# Patient Record
(demographics unavailable — no encounter records)

---

## 2020-09-16 NOTE — PCM.PREANE
<Bridget Marley J - Last Filed: 09/16/20 11:40>





Preanesthetic Assessment





- Procedure


Proposed Procedure: 





Diagnostic Colonoscopy





- Anesthesia/Transfusion/Family Hx


Anesthesia History: No Prior Anesthesia


Family History of Anesthesia Reaction: No


Transfusion History: No Prior Transfusion(s)


Intubation History: Unknown





- Review of Systems


Pulmonary: No Symptoms (asthma)


Cardiovascular: No Symptoms (Elevated cholesterol)


Gastrointestinal: No Symptoms (History of mild diverticulosis), Constipation


Other: Reports: Sinus Problem (allergic rhinitis)





- Physical Assessment


NPO Status Date: 09/16/20


Vital Signs: 





HR:


Sat:


Temp:


Resp:


B/P:





ASA Class: 2


Mental Status: Alert & Oriented x3





- Anesthesia Plan


Pre-Op Medication Ordered: None





- Acknowledgements


Anesthesia Type Planned: MAC


Pt an Appropriate Candidate for the Planned Anesthesia: Yes


Alternatives and Risks of Anesthesia Discussed w Pt/Guardian: Yes


Pt/Guardian Understands and Agrees with Anesthesia Plan: Yes





<Camelia Herring M - Last Filed: 09/17/20 08:49>





Preanesthetic Assessment





- Anesthesia/Transfusion/Family Hx


Anesthesia History: Prior Anesthesia Without Reaction


Family History of Anesthesia Reaction: No


Transfusion History: No Prior Transfusion(s)





- Review of Systems


General: No Symptoms


Pulmonary: No Symptoms


Cardiovascular: No Symptoms


Gastrointestinal: No Symptoms, Constipation


Neurological: No Symptoms


Other: Reports: Sinus Problem





- Physical Assessment


NPO Status Date: 09/17/20


NPO Status Time: 04:00


Height: 5 ft 9 in


Weight: 85 kg


ASA Class: 2


Mental Status: Alert & Oriented x3


Airway Class: Mallampati = 1


Dentition: Reports: Normal Dentition


Thyro-Mental Finger Breadths: 3


Mouth Opening Finger Breadths: 3


ROM/Head Extension: Full


Lungs: Clear to Auscultation, Normal Respiratory Effort


Cardiovascular: Regular Rate, Regular Rhythm





- Blood


Blood Available: No





- Anesthesia Plan


Pre-Op Medication Ordered: None





- Acknowledgements


Anesthesia Type Planned: MAC


Pt an Appropriate Candidate for the Planned Anesthesia: Yes


Alternatives and Risks of Anesthesia Discussed w Pt/Guardian: Yes


Pt/Guardian Understands and Agrees with Anesthesia Plan: Yes





PreAnesthesia Questionnaire


Cardiovascular History: Reports: High Cholesterol (was on meds but not any more)


Respiratory History: Reports: Asthma (seasonal-)


Gastrointestinal History: Reports: Chronic Constipation (on and off)


Oncologic (Cancer) History: Reports: None





- Past Surgical History


GI Surgical History: Reports: Colonoscopy





- SUBSTANCE USE


Smoking Status *Q: Never Smoker


Tobacco Use Within Last Twelve Months: No


Second Hand Smoke Exposure: No


Days Per Week of Alcohol Use: 1


Recreational Drug Use History: No





- CURRENT (IN HOUSE) MEDS


Current Meds: 





                               Current Medications





Lactated Ringer's (Ringers, Lactated)  1,000 mls @ 125 mls/hr IV ASDIRECTED ROB


   Stop: 09/17/20 23:00


Lidocaine/Sodium Bicarbonate (Buffered Lidocaine 1% In Ns 8.4%)  0.25 ml IDERM 

ONETIME PRN


   PRN Reason: Prior to IV Start


   Stop: 09/17/20 18:00


Sodium Chloride (Saline Flush)  10 ml FLUSH ASDIRECTED PRN


   PRN Reason: Keep Vein Open


   Stop: 09/17/20 18:00





Discontinued Medications





Fentanyl (Sublimaze) Confirm Administered Dose 100 mcg .ROUTE .STK-MED ONE


   Stop: 09/17/20 06:49


Lidocaine HCl (Lidocaine 1%) Confirm Administered Dose 4 ml .ROUTE .STK-MED ONE


   Stop: 09/17/20 06:49


Propofol (Diprivan  20 Ml) Confirm Administered Dose 200 mg .ROUTE .STK-MED ONE


   Stop: 09/17/20 06:49

## 2020-09-17 NOTE — PCM.PRNOTE
- Free Text/Narrative


Note: 





Date: 9/17/2020


Procedure: diagnostic colonoscopy


Indication: rectal bleeding


Endoscopist: Masoud Dowling MD





Findings: Cecum reached with colonoscope. Excellent prep. No polyps identified. 

No significant diverticular disease. Tortuous colon. Minor internal hemorrhoids.

Significant external hemorrhoidal disease. No anal fissure. 





Detailed Report:





The patient was taken to the endoscopy suite and placed in left lateral 

decubitus position. Time out was performed and monitored anesthesia care was 

initiated. Visual inspection of the anus revealed significant external 

hemorrhoids. Digital rectal exam was unremarkable; not able to induce prolapse 

of any internal hemorrhoids. The lubricated colonoscope was then inserted and 

advanced all the way to the cecum. The ileocecal valve and appendiceal orifice 

were visualized. The prep was excellent. On slow withdrawal of the scope, 

mucosal surfaces were carefully inspected. No polyps were identified. On 

retroflexion within the rectum, minor internal hemorrhoidal prominence was 

noted. Air was suctioned prior to removal of the scope. The patient tolerated 

the procedure well.

## 2020-09-17 NOTE — [id number]
Post Anesthesia Note





- EVALUATION WITHIN 48HRS OF ANESTHETIC


Vital Signs in Normal Range: Yes


Patient Participated in Evaluation: Yes


Respiratory Function Stable: Yes


Airway Patent: Yes


Cardiovascular Function Stable: Yes


Hydration Status Stable: Yes


Pain Control Satisfactory: Yes


Nausea and Vomiting Control Satisfactory: Yes


Mental Status Recovered: Yes


Vital Signs: 


                                Last Vital Signs











Temp  35.9 C L  09/17/20 08:00


 


Pulse  64   09/17/20 08:00


 


Resp  16   09/17/20 08:00


 


BP  134/74   09/17/20 08:00


 


Pulse Ox  97   09/17/20 08:00